# Patient Record
Sex: FEMALE | Race: WHITE | NOT HISPANIC OR LATINO | Employment: OTHER | URBAN - METROPOLITAN AREA
[De-identification: names, ages, dates, MRNs, and addresses within clinical notes are randomized per-mention and may not be internally consistent; named-entity substitution may affect disease eponyms.]

---

## 2019-07-27 ENCOUNTER — APPOINTMENT (EMERGENCY)
Dept: CT IMAGING | Facility: HOSPITAL | Age: 84
End: 2019-07-27
Payer: MEDICARE

## 2019-07-27 ENCOUNTER — HOSPITAL ENCOUNTER (EMERGENCY)
Facility: HOSPITAL | Age: 84
Discharge: HOME | End: 2019-07-28
Attending: EMERGENCY MEDICINE | Admitting: EMERGENCY MEDICINE
Payer: MEDICARE

## 2019-07-27 ENCOUNTER — APPOINTMENT (EMERGENCY)
Dept: RADIOLOGY | Facility: HOSPITAL | Age: 84
End: 2019-07-27
Payer: MEDICARE

## 2019-07-27 VITALS
DIASTOLIC BLOOD PRESSURE: 63 MMHG | RESPIRATION RATE: 20 BRPM | OXYGEN SATURATION: 97 % | SYSTOLIC BLOOD PRESSURE: 146 MMHG | TEMPERATURE: 98.8 F | HEART RATE: 73 BPM

## 2019-07-27 DIAGNOSIS — S02.2XXA NASAL BONE FRACTURE: ICD-10-CM

## 2019-07-27 DIAGNOSIS — S61.411A LACERATION OF RIGHT HAND: ICD-10-CM

## 2019-07-27 DIAGNOSIS — S62.335A DISPLACED FRACTURE OF NECK OF FOURTH METACARPAL BONE, LEFT HAND, INITIAL ENCOUNTER FOR CLOSED FRACTURE: Primary | ICD-10-CM

## 2019-07-27 PROCEDURE — 72125 CT NECK SPINE W/O DYE: CPT

## 2019-07-27 PROCEDURE — 70450 CT HEAD/BRAIN W/O DYE: CPT

## 2019-07-27 PROCEDURE — 99285 EMERGENCY DEPT VISIT HI MDM: CPT | Performed by: EMERGENCY MEDICINE

## 2019-07-27 PROCEDURE — 73110 X-RAY EXAM OF WRIST: CPT

## 2019-07-27 PROCEDURE — 12002 RPR S/N/AX/GEN/TRNK2.6-7.5CM: CPT | Performed by: EMERGENCY MEDICINE

## 2019-07-27 PROCEDURE — 99284 EMERGENCY DEPT VISIT MOD MDM: CPT

## 2019-07-27 PROCEDURE — 73130 X-RAY EXAM OF HAND: CPT

## 2019-07-27 PROCEDURE — 90715 TDAP VACCINE 7 YRS/> IM: CPT | Performed by: EMERGENCY MEDICINE

## 2019-07-27 PROCEDURE — 90471 IMMUNIZATION ADMIN: CPT

## 2019-07-27 PROCEDURE — 70486 CT MAXILLOFACIAL W/O DYE: CPT

## 2019-07-27 PROCEDURE — 29125 APPL SHORT ARM SPLINT STATIC: CPT | Performed by: EMERGENCY MEDICINE

## 2019-07-27 RX ORDER — LIDOCAINE HYDROCHLORIDE 10 MG/ML
5 INJECTION, SOLUTION EPIDURAL; INFILTRATION; INTRACAUDAL; PERINEURAL ONCE
Status: COMPLETED | OUTPATIENT
Start: 2019-07-27 | End: 2019-07-27

## 2019-07-27 RX ORDER — BACITRACIN, NEOMYCIN, POLYMYXIN B 400; 3.5; 5 [USP'U]/G; MG/G; [USP'U]/G
1 OINTMENT TOPICAL ONCE
Status: COMPLETED | OUTPATIENT
Start: 2019-07-27 | End: 2019-07-27

## 2019-07-27 RX ADMIN — LIDOCAINE HYDROCHLORIDE 5 ML: 10 INJECTION, SOLUTION EPIDURAL; INFILTRATION; INTRACAUDAL; PERINEURAL at 21:54

## 2019-07-27 RX ADMIN — TETANUS TOXOID, REDUCED DIPHTHERIA TOXOID AND ACELLULAR PERTUSSIS VACCINE, ADSORBED 0.5 ML: 5; 2.5; 8; 8; 2.5 SUSPENSION INTRAMUSCULAR at 21:55

## 2019-07-27 RX ADMIN — BACITRACIN, NEOMYCIN, POLYMYXIN B 1 SMALL APPLICATION: 400; 3.5; 5 OINTMENT TOPICAL at 21:55

## 2019-07-28 NOTE — ED PROVIDER NOTES
History  Chief Complaint   Patient presents with    Fall     Patient presents via EMS, tripped over curb resulting in fall  Complaining of pain to right fifth finger (avulsion present), right wrist/forearm discomfort, abrasion of bridge of nose, and dental pain  No thinners  49-year-old female presenting emergency department after a mechanical fall just prior to arrival   Patient reports she tripped over curb while walking into a theater today  Unclear if she lost consciousness  She denies any blood thinners  She is complaining of pain particularly in her right hand with swelling and bruising as well as 2 lacerations to her right pinky finger  She has difficulty closing her hand  Denies any pain at the wrist   Denies any pain at the elbow  She notes dental pain in triage with a small crack in her right frontal incisor on the upper jaw  Denies any preceding symptoms fall including chest pain, shortness of breath, lightheadedness, or palpitations  Denies any other complaints  Otherwise negative review of systems  None       Past Medical History:   Diagnosis Date    Dementia     Hypertension     Insomnia        Past Surgical History:   Procedure Laterality Date    BREAST SURGERY         History reviewed  No pertinent family history  I have reviewed and agree with the history as documented  Social History     Tobacco Use    Smoking status: Never Smoker    Smokeless tobacco: Never Used   Substance Use Topics    Alcohol use: Not Currently     Frequency: Never    Drug use: Never        Review of Systems   Constitutional: Negative  HENT: Positive for dental problem  Negative for facial swelling, nosebleeds, trouble swallowing and voice change  Eyes: Negative for visual disturbance  Respiratory: Negative for chest tightness and shortness of breath  Cardiovascular: Negative for chest pain and palpitations  Gastrointestinal: Negative for abdominal pain, nausea and vomiting  Genitourinary: Negative  Musculoskeletal: Positive for arthralgias and joint swelling  Negative for back pain, gait problem, neck pain and neck stiffness  Skin: Negative for pallor and wound  Neurological: Negative for weakness, numbness and headaches  Physical Exam  ED Triage Vitals   Temperature Pulse Respirations Blood Pressure SpO2   07/27/19 1953 07/27/19 1953 07/27/19 1953 07/27/19 1953 07/27/19 1953   98 8 °F (37 1 °C) 57 18 137/64 97 %      Temp Source Heart Rate Source Patient Position - Orthostatic VS BP Location FiO2 (%)   07/27/19 1953 07/27/19 2150 -- 07/27/19 1953 --   Oral Monitor  Right arm       Pain Score       --                    Orthostatic Vital Signs  Vitals:    07/27/19 1953 07/27/19 2150   BP: 137/64 146/63   Pulse: 57 73       Physical Exam   Constitutional: She is oriented to person, place, and time  She appears well-developed and well-nourished  No distress  HENT:   Head: Normocephalic  Right Ear: External ear normal    Left Ear: External ear normal    Mouth/Throat: Oropharynx is clear and moist    Abrasions over the right side of the forehead and around the right orbit  There is a 1 cm laceration with close approximation and controlled bleeding over the bridge of the nose  No bony tenderness, step-offs, or crepitus in the face  There is a hairline crack along the upper right front incisor  No laxity or pain with percussion of the tooth  Eyes: Pupils are equal, round, and reactive to light  EOM are normal    Neck: Normal range of motion  Neck supple  No tracheal deviation present  Cardiovascular: Normal rate, regular rhythm, normal heart sounds and intact distal pulses  Pulmonary/Chest: Effort normal and breath sounds normal  No respiratory distress  She exhibits no tenderness  Abdominal: Soft  Bowel sounds are normal  There is no tenderness  Musculoskeletal: Normal range of motion  She exhibits no edema, tenderness or deformity     Edema over the medial aspect of the right hand  There is pain at the base of the 4th and 5th fingers  She has difficulty with gripping with this hand  Normal range of motion at the wrist   No snuffbox tenderness  No tenderness to the elbow or shoulder  No midline spinal tenderness, no tenderness to anterior posterior or lateral compression of the chest wall, pelvis is stable, no bony tenderness over the lower extremities  Neurological: She is alert and oriented to person, place, and time  No sensory deficit  She exhibits normal muscle tone  Coordination normal    Normal gait  Skin: Skin is warm and dry  Capillary refill takes less than 2 seconds  3 cm avulsion injury on the outer side of the pinky finger on the right with another 1 cm laceration just distal to this  Extends into subcutaneous tissue without any bone or tendon exposure  Nursing note and vitals reviewed  ED Medications  Medications   lidocaine (PF) (XYLOCAINE-MPF) 1 % injection 5 mL (5 mL Infiltration Given by Other 7/27/19 2154)   neomycin-bacitracin-polymyxin b (NEOSPORIN) ointment 1 small application (1 small application Topical Given 7/27/19 2155)   tetanus-diphtheria-acellular pertussis (BOOSTRIX) IM injection 0 5 mL (0 5 mL Intramuscular Given 7/27/19 2155)       Diagnostic Studies  Results Reviewed     None                 CT facial bones without contrast   Final Result by Carlo Singleton MD (07/27 2223)      Age-indeterminate fracture of the left nasal bone                  Workstation performed: [id number]         CT cervical spine without contrast   Final Result by Carlo Singleton MD (07/27 2215)      No cervical spine fracture or traumatic malalignment  Workstation performed: [id number]         CT head without contrast   Final Result by Carlo Singleton MD (07/27 2218)      No acute intracranial abnormality  Microangiopathic changes                    Workstation performed: [id number]         XR hand 3+ views RIGHT   Final Result by Jake Glass MD (07/28 0858)      Acute displaced fractures at the base of the 4th and 5th metacarpals  The study was marked in [facility] for immediate notification  Workstation performed: [id number]         XR wrist 3+ views RIGHT   Final Result by Jake Glass MD (07/28 7380)      Acute displaced fractures at the base of the 4th and 5th metacarpals  The study was marked in [facility] for immediate notification  Workstation performed: [id number]               Procedures  Splint application  Date/Time: 7/28/2019 12:05 AM  Performed by: Jared Ledbetter MD  Authorized by: Jared Ledbetter MD     Patient location:  ED  Procedure performed by emergency physician: No    Consent:     Consent obtained:  Verbal    Consent given by:  Patient    Risks discussed:  Discoloration, numbness, pain and swelling    Alternatives discussed:  No treatment and delayed treatment  Universal protocol:     Procedure explained and questions answered to patient or proxy's satisfaction: yes      Relevant documents present and verified: yes      Test results available and properly labeled: yes      Radiology Images displayed and confirmed  If images not available, report reviewed: yes      Required blood products, implants, devices, and special equipment available: yes      Site/side marked: yes      Immediately prior to procedure a time out was called: yes      Patient identity confirmed:  Verbally with patient  Indication:     Indications: fracture    Pre-procedure details:     Sensation:  Normal  Procedure details:     Laterality:  Right    Location:  Hand    Hand:  R hand    Splint type:  Ulnar gutter    Supplies:  Ortho-Glass  Post-procedure details:     Pain:  Unchanged    Sensation:  Normal    Neurovascular Exam: skin pink, capillary refill <2 sec and skin intact, warm, and dry      Patient tolerance of procedure:   Tolerated well, no immediate complications    Laceration repair  Date/Time: 7/28/2019 12:05 AM  Performed by: Peace Baker MD  Authorized by: Peace Baker MD   Consent: Verbal consent obtained  Risks and benefits: risks, benefits and alternatives were discussed  Consent given by: patient  Patient understanding: patient states understanding of the procedure being performed  Patient consent: the patient's understanding of the procedure matches consent given  Procedure consent: procedure consent matches procedure scheduled  Relevant documents: relevant documents present and verified  Test results: test results available and properly labeled  Site marked: the operative site was marked  Radiology Images displayed and confirmed  If images not available, report reviewed: imaging studies available  Required items: required blood products, implants, devices, and special equipment available  Patient identity confirmed: verbally with patient  Time out: Immediately prior to procedure a "time out" was called to verify the correct patient, procedure, equipment, support staff and site/side marked as required  Body area: upper extremity  Location details: right small finger  Laceration length: 4 cm  Foreign bodies: no foreign bodies  Tendon involvement: none  Nerve involvement: none  Vascular damage: no  Anesthesia: local infiltration    Anesthesia:  Anesthetic total: 3 mL    Wound Dehiscence:  Superficial Wound Dehiscence: simple closure      Procedure Details:  Preparation: Patient was prepped and draped in the usual sterile fashion    Irrigation solution: saline  Irrigation method: jet lavage  Amount of cleaning: extensive  Debridement: none  Degree of undermining: none  Skin closure: 5-0 nylon  Number of sutures: 5  Technique: simple  Approximation: close  Approximation difficulty: simple  Dressing: antibiotic ointment and 4x4 sterile gauze  Patient tolerance: Patient tolerated the procedure well with no immediate complications              ED Course                               MDM  Number of Diagnoses or Management Options  Displaced fracture of neck of fourth metacarpal bone, left hand, initial encounter for closed fracture:   Laceration of right hand:   Nasal bone fracture:   Diagnosis management comments: 20-year-old female presenting emergency department for evaluation of right hand pain and facial injuries after a mechanical fall  Signs of trauma on the face with a normal neurologic exam   We will do CT scan of the facial bones as well as the head and neck due to mechanism of injury and the abrasions to her face  Plan to glue the small laceration to the nose  We will repair the lacerations to the right hand with sutures  Disposition  Final diagnoses:   Displaced fracture of neck of fourth metacarpal bone, left hand, initial encounter for closed fracture   Laceration of right hand   Nasal bone fracture     Time reflects when diagnosis was documented in both MDM as applicable and the Disposition within this note     Time User Action Codes Description Comment    7/27/2019 10:25 PM Igor Holden Displaced fracture of neck of fourth metacarpal bone, left hand, initial encounter for closed fracture     7/27/2019 10:26 PM Shantel Burgess Add [H49 254S] Laceration of right hand     7/27/2019 10:26 PM Kenia Holden Add [S02  2XXA] Nasal bone fracture       ED Disposition     ED Disposition Condition Date/Time Comment    Discharge Stable Sat Jul 27, 2019 10:26 PM Geovany Oden discharge to home/self care  Follow-up Information     Follow up With Specialties Details Why [id number]   [address] [address] [phone]      Your PCP  Schedule an appointment as soon as possible for a visit in 1 week For suture removal, For wound re-check           There are no discharge medications for this patient  No discharge procedures on file  ED Provider  Attending physically available and evaluated Geovany Oden  I managed the patient along with the ED Attending      Electronically Signed by         Sapphire Rosario MD  07/28/19 Lisa Ba MD  07/28/19 5503

## 2019-07-28 NOTE — ED NOTES
Pt son states "Where's the doctor? Is he coming back in? We want to go and we need the results of her scan " Pt made aware that provider was unavailable at this time and in another patients room and that provider would be in as soon as he/she would be available  Pt son states "Well, I'm unhappy  This is a flawed system  We've been waiting for a long time " RN apologized to pt and pt son and reassured that provider would be in as soon as they became available        Srini Azevedo RN  07/28/19 9722

## 2019-07-28 NOTE — ED NOTES
Patients son pacing in [facility] informed him he would need to wait in the room  Son states still waiting for someone to see her  Tech informed son that his mother was triaged, assessed and that her nurse has other patients she is caring for and would be in as soon as she is able       Alessandra Sweeney  07/27/19 2035

## 2019-07-28 NOTE — ED ATTENDING ATTESTATION
ISilver 24, DO, saw and evaluated the patient  I have discussed the patient with the resident/non-physician practitioner and agree with the resident's/non-physician practitioner's findings, Plan of Care, and MDM as documented in the resident's/non-physician practitioner's note, except where noted  All available labs and Radiology studies were reviewed  I was present for key portions of any procedure(s) performed by the resident/non-physician practitioner and I was immediately available to provide assistance  At this point I agree with the current assessment done in the Emergency Department  I have conducted an independent evaluation of this patient a history and physical is as follows:    80 y o  F p/w fall  Pt tripped on a curb and tried to break fall with right hand  Hit face  Unsure if she had LOC  Not on thinners  Having pain in right hand with laceration to little finger  Abrasions to face and small lac to bridge of nose  Also having pain in teeth, but no malocclusion  Neuro intact  Plan: mechanical fall - Will check CT head/C-spine/face and xray hand/wrist, update Tetanus      Critical Care Time  Procedures

## 2019-07-28 NOTE — ED NOTES
Patients son has come out of the room several times asking "I would like some sense of what is going on with care" tech informed patients son that her primary nurse was made aware and that a doctor would be signing up to see her shortly        Pepper Sweeney  07/27/19 2027